# Patient Record
Sex: MALE | Race: WHITE | NOT HISPANIC OR LATINO
[De-identification: names, ages, dates, MRNs, and addresses within clinical notes are randomized per-mention and may not be internally consistent; named-entity substitution may affect disease eponyms.]

---

## 2019-11-18 ENCOUNTER — TRANSCRIPTION ENCOUNTER (OUTPATIENT)
Age: 54
End: 2019-11-18

## 2019-11-24 ENCOUNTER — TRANSCRIPTION ENCOUNTER (OUTPATIENT)
Age: 54
End: 2019-11-24

## 2019-12-03 ENCOUNTER — TRANSCRIPTION ENCOUNTER (OUTPATIENT)
Age: 54
End: 2019-12-03

## 2020-01-18 ENCOUNTER — TRANSCRIPTION ENCOUNTER (OUTPATIENT)
Age: 55
End: 2020-01-18

## 2020-07-06 ENCOUNTER — TRANSCRIPTION ENCOUNTER (OUTPATIENT)
Age: 55
End: 2020-07-06

## 2021-01-05 ENCOUNTER — TRANSCRIPTION ENCOUNTER (OUTPATIENT)
Age: 56
End: 2021-01-05

## 2021-03-30 ENCOUNTER — TRANSCRIPTION ENCOUNTER (OUTPATIENT)
Age: 56
End: 2021-03-30

## 2021-05-02 ENCOUNTER — TRANSCRIPTION ENCOUNTER (OUTPATIENT)
Age: 56
End: 2021-05-02

## 2021-08-30 ENCOUNTER — APPOINTMENT (OUTPATIENT)
Dept: UROLOGY | Facility: CLINIC | Age: 56
End: 2021-08-30
Payer: MEDICAID

## 2021-08-30 VITALS
RESPIRATION RATE: 17 BRPM | DIASTOLIC BLOOD PRESSURE: 73 MMHG | TEMPERATURE: 98 F | OXYGEN SATURATION: 98 % | BODY MASS INDEX: 31.14 KG/M2 | HEIGHT: 73 IN | WEIGHT: 235 LBS | HEART RATE: 70 BPM | SYSTOLIC BLOOD PRESSURE: 108 MMHG

## 2021-08-30 PROCEDURE — 99204 OFFICE O/P NEW MOD 45 MIN: CPT

## 2021-08-30 PROCEDURE — 51798 US URINE CAPACITY MEASURE: CPT

## 2021-08-30 NOTE — PHYSICAL EXAM
[General Appearance - Well Developed] : well developed [General Appearance - Well Nourished] : well nourished [Normal Appearance] : normal appearance [Well Groomed] : well groomed [General Appearance - In No Acute Distress] : no acute distress [Edema] : no peripheral edema [Respiration, Rhythm And Depth] : normal respiratory rhythm and effort [Exaggerated Use Of Accessory Muscles For Inspiration] : no accessory muscle use [Abdomen Soft] : soft [Abdomen Tenderness] : non-tender [Costovertebral Angle Tenderness] : no ~M costovertebral angle tenderness [Urethral Meatus] : meatus normal [Penis Abnormality] : normal circumcised penis [Urinary Bladder Findings] : the bladder was normal on palpation [Scrotum] : the scrotum was normal [Rectal Exam - Seminal Vesicles] : the seminal vesicles were normal [Epididymis] : the epididymides were normal [Testes Tenderness] : no tenderness of the testes [Testes Mass (___cm)] : there were no testicular masses [Rectal Exam - Rectum] : digital rectal exam was normal [Prostate Tenderness] : the prostate was not tender [No Prostate Nodules] : no prostate nodules [Normal Station and Gait] : the gait and station were normal for the patient's age [] : no rash [Oriented To Time, Place, And Person] : oriented to person, place, and time [Affect] : the affect was normal [Mood] : the mood was normal [Not Anxious] : not anxious [Inguinal Lymph Nodes Enlarged Bilaterally] : inguinal [FreeTextEntry1] : enlarged prostate

## 2021-08-30 NOTE — ASSESSMENT
[FreeTextEntry1] : \par plan: \par - ordered new PSA\par - sent new urine culture today\par - started tamsolusin 0.4\par - ordered new ultrasound\par - follow up in 1 month with results and uroflow

## 2021-08-30 NOTE — HISTORY OF PRESENT ILLNESS
[Urinary Urgency] : urinary urgency [Urinary Frequency] : urinary frequency [Nocturia] : nocturia [Straining] : straining [Weak Stream] : weak stream [None] : None [FreeTextEntry1] : 57 yo M for initial consultation\par PSH: bilateral  ACL\par NKDA\par \par for about 10 years complaining of LUTS\par mainly of frequency, started on supplemental medication, never took any medications\par here for consultation due to worsening in symptoms\par complains of: nocturia 3-4, frequency of more than 12 times a day, starting to feel more urgency too, needs to strain, weak stream.\par no history of UTI, retention or hematuria\par \par the patient is also a heavy coffee drinker, discussed lifestyle modifications, including less caffeine and avoiding caffeine and alcohol especially in the evening hours.\par discussed options for medical treatment and side effects, including retrograde ejaculation.\par exam today shows enlarged prostate\par PVR 93 ml\par \par \par has PSA from 2013 - 2.39 ng/ml\par discussed psa screening and the importance of it\par explained what PSA is\par \par \par plan: \par - ordered new PSA\par - sent new urine culture today\par - started tamsolusin 0.4\par - ordered new ultrasound\par - follow up in 1 month with results and uroflow [Urinary Incontinence] : no urinary incontinence [Urinary Retention] : no urinary retention [Dysuria] : no dysuria [Hematuria - Gross] : no gross hematuria [Hematuria - Microscopic] : no microscopic hematuria [Bladder Spasm] : no bladder spasm [Abdominal Pain] : no abdominal pain [Flank Pain] : no flank pain

## 2021-08-30 NOTE — REVIEW OF SYSTEMS
[Earache] : earache [Cough] : cough [Wake up at night to urinate  How many times?  ___] : wakes up to urinate [unfilled] times during the night [Strong urge to urinate] : strong urge to urinate [Strain or push to urinate] : strain or push to urinate [Slow urine stream] : slow urine stream [Joint Pain] : joint pain [Leakage of urine with straining, coughing, laughing] : leakage of urine with straining, coughing, laughing [Joint Swelling] : joint swelling [Limb Swelling] : limb swelling [Dizziness] : dizziness [Limb Weakness] : limb weakness [Difficulty Walking] : difficulty walking [Muscle Weakness] : muscle weakness [Feelings Of Weakness] : feelings of weakness [Negative] : Heme/Lymph

## 2021-09-01 DIAGNOSIS — N39.0 URINARY TRACT INFECTION, SITE NOT SPECIFIED: ICD-10-CM

## 2021-09-01 RX ORDER — SULFAMETHOXAZOLE AND TRIMETHOPRIM 800; 160 MG/1; MG/1
800-160 TABLET ORAL TWICE DAILY
Qty: 14 | Refills: 0 | Status: ACTIVE | COMMUNITY
Start: 2021-09-01 | End: 1900-01-01

## 2021-09-06 LAB — BACTERIA UR CULT: ABNORMAL

## 2021-09-06 RX ORDER — AMOXICILLIN AND CLAVULANATE POTASSIUM 875; 125 MG/1; MG/1
875-125 TABLET, COATED ORAL
Qty: 14 | Refills: 0 | Status: ACTIVE | COMMUNITY
Start: 2021-09-06 | End: 1900-01-01

## 2021-10-04 ENCOUNTER — APPOINTMENT (OUTPATIENT)
Dept: UROLOGY | Facility: CLINIC | Age: 56
End: 2021-10-04

## 2021-10-18 ENCOUNTER — APPOINTMENT (OUTPATIENT)
Dept: UROLOGY | Facility: CLINIC | Age: 56
End: 2021-10-18
Payer: MEDICAID

## 2021-10-18 PROCEDURE — 51798 US URINE CAPACITY MEASURE: CPT

## 2021-10-18 PROCEDURE — 99214 OFFICE O/P EST MOD 30 MIN: CPT

## 2021-10-18 NOTE — PHYSICAL EXAM
[General Appearance - Well Developed] : well developed [General Appearance - Well Nourished] : well nourished [Normal Appearance] : normal appearance [Well Groomed] : well groomed [General Appearance - In No Acute Distress] : no acute distress [Abdomen Soft] : soft [Abdomen Tenderness] : non-tender [Costovertebral Angle Tenderness] : no ~M costovertebral angle tenderness [Skin Color & Pigmentation] : normal skin color and pigmentation [Edema] : no peripheral edema [] : no respiratory distress [Respiration, Rhythm And Depth] : normal respiratory rhythm and effort [Exaggerated Use Of Accessory Muscles For Inspiration] : no accessory muscle use [Oriented To Time, Place, And Person] : oriented to person, place, and time [Affect] : the affect was normal [Mood] : the mood was normal [Not Anxious] : not anxious

## 2021-10-18 NOTE — HISTORY OF PRESENT ILLNESS
[Urinary Frequency] : urinary frequency [Nocturia] : nocturia [None] : None [FreeTextEntry1] : 57 yo M for follow up\par see full notes from previous visit\par \par started on tamsolusin and feeling much better with his voiding\par was also treated for UTI after last visit\par did not have a chance to do the PSA and ultrasound\par \par reviewed symptoms again, patient does not have any special complains\par could not perform a uroflow today (problem with equipment)\par PVR today: 85 ml\par \par again recommended ultrasound and PSA\par \par plan:\par - PSA\par - ultrasound\par - next visit in 1 month for uroflow and PVR

## 2021-10-18 NOTE — ASSESSMENT
[FreeTextEntry1] : \par plan:\par - PSA\par - ultrasound\par - next visit in 1 month for uroflow and PVR

## 2021-10-22 ENCOUNTER — OUTPATIENT (OUTPATIENT)
Dept: OUTPATIENT SERVICES | Facility: HOSPITAL | Age: 56
LOS: 1 days | End: 2021-10-22
Payer: MEDICAID

## 2021-10-22 ENCOUNTER — APPOINTMENT (OUTPATIENT)
Dept: ULTRASOUND IMAGING | Facility: CLINIC | Age: 56
End: 2021-10-22
Payer: MEDICAID

## 2021-10-22 ENCOUNTER — TRANSCRIPTION ENCOUNTER (OUTPATIENT)
Age: 56
End: 2021-10-22

## 2021-10-22 DIAGNOSIS — N40.1 BENIGN PROSTATIC HYPERPLASIA WITH LOWER URINARY TRACT SYMPTOMS: ICD-10-CM

## 2021-10-22 PROCEDURE — 76770 US EXAM ABDO BACK WALL COMP: CPT

## 2021-10-22 PROCEDURE — 76770 US EXAM ABDO BACK WALL COMP: CPT | Mod: 26

## 2021-11-15 ENCOUNTER — APPOINTMENT (OUTPATIENT)
Dept: UROLOGY | Facility: CLINIC | Age: 56
End: 2021-11-15

## 2021-12-06 ENCOUNTER — APPOINTMENT (OUTPATIENT)
Dept: UROLOGY | Facility: CLINIC | Age: 56
End: 2021-12-06
Payer: MEDICAID

## 2021-12-06 VITALS
WEIGHT: 235 LBS | HEART RATE: 68 BPM | OXYGEN SATURATION: 98 % | SYSTOLIC BLOOD PRESSURE: 119 MMHG | BODY MASS INDEX: 31.14 KG/M2 | HEIGHT: 73 IN | DIASTOLIC BLOOD PRESSURE: 83 MMHG

## 2021-12-06 PROCEDURE — 99214 OFFICE O/P EST MOD 30 MIN: CPT

## 2021-12-06 NOTE — HISTORY OF PRESENT ILLNESS
[FreeTextEntry1] : 57 yo male for follow up \par no FH of prostate cancer\par \par on tamsulosin with improved symptoms\par \par ultrasound: \par - normal kidneys\par - diffuse thickening of bladder wall consistent with chronic obstruction, PVR 84 ml\par - prostate 22 ml\par \par still no PSA\par \par also discussed the option of Urolift as a minimally invasive, office based procedure instead of the tamsulosin.\par provided printed information, and will assist in scheduling an appointment with Dr. Hsu if he is interested.\par \par plan:\par - will order PSA again\par - will update results over the phone\par - next visit in 6 months\par

## 2021-12-06 NOTE — ASSESSMENT
[FreeTextEntry1] : \par plan:\par - will order PSA again\par - will update results over the phone\par - next visit in 6 months\par

## 2021-12-17 LAB
PSA FREE FLD-MCNC: 11 %
PSA FREE SERPL-MCNC: 0.19 NG/ML
PSA SERPL-MCNC: 1.74 NG/ML

## 2022-05-10 ENCOUNTER — APPOINTMENT (OUTPATIENT)
Dept: ORTHOPEDIC SURGERY | Facility: CLINIC | Age: 57
End: 2022-05-10
Payer: OTHER MISCELLANEOUS

## 2022-05-10 ENCOUNTER — NON-APPOINTMENT (OUTPATIENT)
Age: 57
End: 2022-05-10

## 2022-05-10 DIAGNOSIS — M25.551 PAIN IN RIGHT HIP: ICD-10-CM

## 2022-05-10 PROCEDURE — 99072 ADDL SUPL MATRL&STAF TM PHE: CPT

## 2022-05-10 PROCEDURE — 99204 OFFICE O/P NEW MOD 45 MIN: CPT | Mod: 25

## 2022-05-10 PROCEDURE — 20610 DRAIN/INJ JOINT/BURSA W/O US: CPT | Mod: RT

## 2022-05-11 PROBLEM — M25.551 RIGHT HIP PAIN: Status: ACTIVE | Noted: 2022-05-10

## 2022-05-11 NOTE — HISTORY OF PRESENT ILLNESS
[de-identified] : JUNO GARCIA is a 57 year y/o male who presents for initial visit of Right hip pain. On 2/26/ patient stepped onto patio, however, it was icy, and accidnetally patient fell onto his Right hip. He has been seeing Dr. Plascencia for hip pain. Dr. Plascencia dx with labral tear and tx with TPI (lateral hip), which provided him short term relief. He has been doing PT at [facility] in [place]; however, he was recently approved for it. He localizes most of his pain over lateral aspect of the hip. He describes pain as dull achy that exacerbates with certain movements. He presents with MRI. MRI reveals: \par \par 1. Anterior superior labral tear \par 2. No evidence of acute osseous injury.

## 2022-05-11 NOTE — REASON FOR VISIT
[Initial Visit] : an initial visit for [Workers' Comp: Date of Injury: _______] : This visit is related to worker's compensation. Date of Injury: [unfilled] [FreeTextEntry2] : Right hip pain

## 2022-05-11 NOTE — PHYSICAL EXAM
[de-identified] : Physical Exam:\par General: Well appearing, no acute distress\par Neurologic: A&Ox3, No focal deficits\par Head: NCAT without abrasions, lacerations, or ecchymosis to head, face, or scalp\par Eyes: No scleral icterus, no gross abnormalities\par Respiratory: Equal chest wall expansion bilaterally, no accessory muscle use\par Lymphatic: No lymphadenopathy palpated\par Skin: Warm and dry\par Psychiatric: Normal mood and affect\par \par \par On inspection of the Right hip shows no gross abnormalities. No loss muscle volume. Skin appears normal. \par Negative Heel strike, negative log roll. \par At 120° of flexion internal rotation is limited to 15°. Not painful in the groin. External rotation is limited to 25°. Mild pain. Hip flexor strength is 4-5 because of pain.  Anterior hip impingement signs are negative No evidence of posterior impingement. \par Resisted straight leg raise does not produce groin pain. No signs of the iliopsoas tendinitis. KARLOS Test (Davey's Test): positive FADIR Test (Labral Tear/LAURA): negative Stinchfield: Negative\par No audible or palpable clicking. \par On lateral decubitus examination there is focal area of her greater trochanteric tenderness. Abductor strength is 5 out of 5.\par \par Motor strength is intact distally, 5/5 over quads,hamstring, EHL, FHL, GSC, TA.\par Sensation intact over L1-S1 dermatomes\par \par Left hip Exam\par \par ·	Skin: Clean/dry and intact\par ·	Inspection: No obvious deformity, no swelling.\par ·	Pulses: 2+ DP/PT pulses\par ·	ROM: 130 flexion without pain. Internal rotation to 15. External rotation to 45.\par ·	Painful ROM: None\par ·	Tenderness: No tenderness over greater trochanter/glut medius insertion. No groin pain. No ttp over the ASIS/Illiac crest.\par ·	Strength: 5/5 ADD/ABD/Q/H/TA/GS/EHL\par ·	Neuro: Sensation in tact to light touch throughout, DTR normal\par ·	Additional tests: Negative impingement test  [de-identified] : Bilateral AP, frog leg, Mcginnis views and false profile views were performed today in the office. They demonstrate no fracture, no deformity, no dislocation, no bony lesions.\par  \par Procedure: MRI of Right hip \par Dated: 4/13/2022\par \par Impression:\par \par 1. Anterior superior labral tear \par 2. No evidence of acute osseous injury.

## 2022-05-11 NOTE — ADDENDUM
[FreeTextEntry1] : Documented by Gustavo Foreman acting as a scribe for Dr. Siu and Rajnit Laurent PA-C on 05/10/2022. \par \par All medical record entries made by the Scribe were at my, Ranjit Laurent's, direction and\par personally dictated by me on 05/10/2022. I have reviewed the chart and agree that the record\par accurately reflects my personal performance of the history, physical exam, procedure and imaging.

## 2022-05-11 NOTE — REVIEW OF SYSTEMS
[Arthralgia] : arthralgia [Joint Pain] : joint pain [Negative] : Heme/Lymph [FreeTextEntry9] : Right hip

## 2022-05-11 NOTE — DISCUSSION/SUMMARY
[de-identified] : JUNO GARCIA is a 57 year y/o male who presents for initial visit of Right hip pain. On 2/26/ patient stepped onto patio, however, it was icy, and accidnetally patient fell onto his Right hip. He has been seeing Dr. Plascencia for hip pain. Dr. Plascencia dx with labral tear and tx with TPI (lateral hip), which provided him short term relief. He has been doing PT at [facility] in [place]; however, he was recently approved for it. He localizes most of his pain over lateral aspect of the hip. He describes pain as dull achy that exacerbates with certain movements. He presents with MRI. \par \par We had a thorough discussion regarding the nature of his pain, the pathophysiology, as well as all treatment options. The patient has symptoms of greater trochanteric bursitis/gluteus medius insertional tendinopathy that is consistent with clinical examination today. Aggravating factors may include prolonged sitting, lying or standing on the affected side, and the patient was counseled on avoidance. Treatment of this condition typically involves anti-inflammatory medications versus corticosteroid/lidocaine injections (if able) and physical therapy that includes core and hip strengthening, stretching of the iliotibial band, and lower extremity mobilization. His MRI does indicate labral tear; however clinically patient does not have pain secondary to labral tear. Pt due to his acute pain elected for a corticosteroid injection at today's visit and tolerated the procedure well. He should take it easy for the next 2-3 days while icing the joint. Conservative measures of treatment include rest until asymptomatic, activity avoidance, NSAID's PRN, application to ice to the area 2-3x daily for 20 minutes, with gradual return to activities. He will continue PT. Patient will follow up in 6-8 wks for repeat clinical assessment. All questions were answered and the patient verbalized understanding. The patient is in agreement with this treatment plan. \par \par

## 2022-05-11 NOTE — PROCEDURE
[de-identified] : Injection: Right hip trochanteric bursa.\par Indication: Trochanteric bursitis.\par \par A discussion was had with the patient regarding this procedure and all questions were answered. All risks, benefits and alternatives were discussed. These included but were not limited to bleeding, infection, and allergic reaction. The patient lay in the left lateral decubitus position and the point of maximal tenderness over the right greater trochanter was localized. Alcohol was then used to clean the skin, and betadine was used to sterilize and prep the area in this location on lateral aspect of the hip. Ethyl chloride spray was then used as a topical anesthetic. A 22-gauge spinal needle was used to inject 3cc of 1% Xylocaine, 2cc of 0.25% Bupivacaine and 1cc of 40mg/mL Triamcinolone Acetonide into the greater trochanteric bursa. A sterile bandage was then applied. The patient tolerated the procedure well and there were no complications.

## 2022-06-28 ENCOUNTER — APPOINTMENT (OUTPATIENT)
Dept: ORTHOPEDIC SURGERY | Facility: CLINIC | Age: 57
End: 2022-06-28
Payer: OTHER MISCELLANEOUS

## 2022-06-28 PROCEDURE — 99072 ADDL SUPL MATRL&STAF TM PHE: CPT

## 2022-06-28 PROCEDURE — 99214 OFFICE O/P EST MOD 30 MIN: CPT

## 2022-06-29 ENCOUNTER — FORM ENCOUNTER (OUTPATIENT)
Age: 57
End: 2022-06-29

## 2022-06-29 NOTE — PHYSICAL EXAM
[de-identified] : Physical Exam:\par General: Well appearing, no acute distress\par Neurologic: A&Ox3, No focal deficits\par Head: NCAT without abrasions, lacerations, or ecchymosis to head, face, or scalp\par Eyes: No scleral icterus, no gross abnormalities\par Respiratory: Equal chest wall expansion bilaterally, no accessory muscle use\par Lymphatic: No lymphadenopathy palpated\par Skin: Warm and dry\par Psychiatric: Normal mood and affect\par \par \par On inspection of the Right hip shows no gross abnormalities. No loss muscle volume. Skin appears normal. \par Negative Heel strike, negative log roll. \par At 120° of flexion internal rotation is limited to 15°. + painful in the groin. External rotation is limited to 25°. Mild pain. Hip flexor strength is 4-5 because of pain.  Anterior hip impingement signs are negative. No evidence of posterior impingement. \par Resisted straight leg raise does not produce groin pain. No signs of the iliopsoas tendinitis. KARLOS Test (Davey's Test): positive FADIR Test (Labral Tear/LAURA): negative Stinchfield: Negative\par No audible or palpable clicking. \par On lateral decubitus examination there is focal area of her greater trochanteric tenderness. Abductor strength is 5 out of 5.\par \par Motor strength is intact distally, 5/5 over quads,hamstring, EHL, FHL, GSC, TA.\par Sensation intact over L1-S1 dermatomes\par \par Left hip Exam\par \par ·	Skin: Clean/dry and intact\par ·	Inspection: No obvious deformity, no swelling.\par ·	Pulses: 2+ DP/PT pulses\par ·	ROM: 130 flexion without pain. Internal rotation to 15. External rotation to 45.\par ·	Painful ROM: None\par ·	Tenderness: No tenderness over greater trochanter/glut medius insertion. No groin pain. No ttp over the ASIS/Illiac crest.\par ·	Strength: 5/5 ADD/ABD/Q/H/TA/GS/EHL\par ·	Neuro: Sensation in tact to light touch throughout, DTR normal\par ·	Additional tests: Negative impingement test  [de-identified] : Bilateral AP, frog leg, Mcginnis views and false profile views were performed today in the office. They demonstrate no fracture, no deformity, no dislocation, no bony lesions.\par  \par Procedure: MRI of Right hip \par Dated: 4/13/2022\par \par Impression:\par \par 1. Anterior superior labral tear \par 2. No evidence of acute osseous injury.

## 2022-06-29 NOTE — HISTORY OF PRESENT ILLNESS
[de-identified] : JUNO GARCIA is a 57 year y/o male who presents for initial visit of Right hip pain. On 2/26/ patient stepped onto patio, however, it was icy, and accidnetally patient fell onto his Right hip. He has been seeing Dr. Plascnecia for hip pain. Dr. Plascencia dx with labral tear and tx with TPI (lateral hip), which provided him short term relief.  He had a greater trochanteric bursal injection at his last visit which offered him 25% relief.  He has been doing PT at [facility] in [place]; however, he was recently approved for it. He also reports he is being treated by a chiropractor for his lower back and neck.  He is having pain in the gluteal region radiating towards his groin and down his leg. Prior MRI reveals: \par \par 1. Anterior superior labral tear \par 2. No evidence of acute osseous injury.

## 2022-06-29 NOTE — DISCUSSION/SUMMARY
[de-identified] : JUNO GARCIA is a 57 year y/o male who presents for initial visit of Right hip pain. On 2/26/ patient stepped onto patio, however, it was icy, and accidnetally patient fell onto his Right hip. He has been seeing Dr. Plascencia for hip pain. Dr. Plascencia dx with labral tear and tx with TPI (lateral hip), which provided him short term relief. He has been doing PT at [facility] in [place]; however, he was recently approved for it. He localizes most of his pain over lateral aspect of the hip. He describes pain as dull achy that exacerbates with certain movements. He presents with MRI. \par \par We had a thorough discussion regarding the nature of his pain, the pathophysiology, as well as all treatment options. The patient has a mixed clinical picture.  I explained that some of his pain could certainly be coming from his lower back.  He got 25% relief with a trochanteric bursal injection.  He does appear to have some intra-articular pain.  I recommended an intra-articular therapeutic and diagnostic cortisone injection for his right hip.  We will see how he responds to this injection.  If he gets no relief he may require further work-up of his lumbar spine and further treatment by neurosurgery.  He will follow-up in 4 weeks.  He reports he was recently let go of his position at his job and is unemployed.  He currently demonstrates a moderate to severe temporary impairment.

## 2022-07-11 ENCOUNTER — FORM ENCOUNTER (OUTPATIENT)
Age: 57
End: 2022-07-11

## 2022-07-26 ENCOUNTER — APPOINTMENT (OUTPATIENT)
Dept: ORTHOPEDIC SURGERY | Facility: CLINIC | Age: 57
End: 2022-07-26

## 2022-08-18 ENCOUNTER — APPOINTMENT (OUTPATIENT)
Dept: ORTHOPEDIC SURGERY | Facility: CLINIC | Age: 57
End: 2022-08-18

## 2022-09-13 ENCOUNTER — APPOINTMENT (OUTPATIENT)
Dept: ORTHOPEDIC SURGERY | Facility: CLINIC | Age: 57
End: 2022-09-13

## 2022-09-13 PROCEDURE — 99214 OFFICE O/P EST MOD 30 MIN: CPT

## 2022-09-13 PROCEDURE — 99072 ADDL SUPL MATRL&STAF TM PHE: CPT

## 2022-09-15 NOTE — ADDENDUM
[FreeTextEntry1] : Documented by Terri Turner acting as a scribe for Dr. Siu and Ranjit Laurent PA-C on 09/13/2022. \par \par All medical record entries made by the Scribe were at my, Dr. Siu, and Ranjit Laurent's, direction and\par personally dictated by me on 09/13/2022. I have reviewed the chart and agree that the record\par accurately reflects my personal performance of the history, physical exam, procedure and imaging.

## 2022-09-15 NOTE — HISTORY OF PRESENT ILLNESS
[de-identified] : JUNO GARCIA is a 57 year y/o male who presents for f/u visit of right hip pain. Pt received a prescription for a steroid injection, however was denied when he reached out to the [facility].

## 2022-09-15 NOTE — PHYSICAL EXAM
[de-identified] : Physical Exam:\par General: Well appearing, no acute distress\par Neurologic: A&Ox3, No focal deficits\par Head: NCAT without abrasions, lacerations, or ecchymosis to head, face, or scalp\par Eyes: No scleral icterus, no gross abnormalities\par Respiratory: Equal chest wall expansion bilaterally, no accessory muscle use\par Lymphatic: No lymphadenopathy palpated\par Skin: Warm and dry\par Psychiatric: Normal mood and affect\par \par On inspection of the Right hip shows no gross abnormalities. No loss muscle volume. Skin appears normal. \par Negative Heel strike, negative log roll. \par At 120° of flexion internal rotation is limited to 15°. + painful in the groin. External rotation is limited to 25°. Mild pain. Hip flexor strength is 4-5 because of pain.  Anterior hip impingement signs are negative. No evidence of posterior impingement. \par Resisted straight leg raise does not produce groin pain. No signs of the iliopsoas tendinitis. KARLOS Test (Davey's Test): positive FADIR Test (Labral Tear/LAURA): negative Stinchfield: Negative\par No audible or palpable clicking. \par On lateral decubitus examination there is focal area of her greater trochanteric tenderness. Abductor strength is 5 out of 5.\par \par Motor strength is intact distally, 5/5 over quads,hamstring, EHL, FHL, GSC, TA.\par Sensation intact over L1-S1 dermatomes\par \par Left hip Exam\par \par ·	Skin: Clean/dry and intact\par ·	Inspection: No obvious deformity, no swelling.\par ·	Pulses: 2+ DP/PT pulses\par ·	ROM: 130 flexion without pain. Internal rotation to 15. External rotation to 45.\par ·	Painful ROM: None\par ·	Tenderness: No tenderness over greater trochanter/glut medius insertion. No groin pain. No ttp over the ASIS/Illiac crest.\par ·	Strength: 5/5 ADD/ABD/Q/H/TA/GS/EHL\par ·	Neuro: Sensation in tact to light touch throughout, DTR normal\par ·	Additional tests: Negative impingement test  [de-identified] : No new imaging done today.

## 2022-09-15 NOTE — DISCUSSION/SUMMARY
[de-identified] : JUNO GARCIA is a 57 year y/o male who presents for f/u visit of right hip pain. Pt received a prescription for a steroid injection, however was denied when he reached out to the [facility]. \par \par We had a thorough discussion regarding the nature of his pain, the pathophysiology, as well as all treatment options. I discussed with the patient that the [facility] should accept his insurance for an injection, and to reach out again. All questions were answered and the patient verbalized understanding. The patient is in agreement with this treatment plan.

## 2022-09-15 NOTE — REASON FOR VISIT
[Workers' Comp: Date of Injury: _______] : This visit is related to worker's compensation. Date of Injury: [unfilled] [Initial Visit] : an initial visit for [FreeTextEntry2] : Right hip pain

## 2022-09-20 ENCOUNTER — APPOINTMENT (OUTPATIENT)
Dept: ULTRASOUND IMAGING | Facility: CLINIC | Age: 57
End: 2022-09-20

## 2022-11-03 ENCOUNTER — APPOINTMENT (OUTPATIENT)
Dept: ORTHOPEDIC SURGERY | Facility: CLINIC | Age: 57
End: 2022-11-03

## 2022-11-17 ENCOUNTER — APPOINTMENT (OUTPATIENT)
Dept: ORTHOPEDIC SURGERY | Facility: CLINIC | Age: 57
End: 2022-11-17

## 2022-12-05 ENCOUNTER — RX RENEWAL (OUTPATIENT)
Age: 57
End: 2022-12-05

## 2022-12-05 ENCOUNTER — APPOINTMENT (OUTPATIENT)
Dept: ORTHOPEDIC SURGERY | Facility: CLINIC | Age: 57
End: 2022-12-05

## 2022-12-19 ENCOUNTER — APPOINTMENT (OUTPATIENT)
Dept: ORTHOPEDIC SURGERY | Facility: CLINIC | Age: 57
End: 2022-12-19

## 2023-01-16 ENCOUNTER — APPOINTMENT (OUTPATIENT)
Dept: ORTHOPEDIC SURGERY | Facility: CLINIC | Age: 58
End: 2023-01-16

## 2023-02-14 ENCOUNTER — APPOINTMENT (OUTPATIENT)
Dept: ORTHOPEDIC SURGERY | Facility: CLINIC | Age: 58
End: 2023-02-14

## 2023-03-04 ENCOUNTER — RX RENEWAL (OUTPATIENT)
Age: 58
End: 2023-03-04

## 2023-04-11 ENCOUNTER — APPOINTMENT (OUTPATIENT)
Dept: ORTHOPEDIC SURGERY | Facility: CLINIC | Age: 58
End: 2023-04-11
Payer: OTHER MISCELLANEOUS

## 2023-04-11 PROCEDURE — 99214 OFFICE O/P EST MOD 30 MIN: CPT

## 2023-04-13 ENCOUNTER — FORM ENCOUNTER (OUTPATIENT)
Age: 58
End: 2023-04-13

## 2023-04-13 NOTE — HISTORY OF PRESENT ILLNESS
[de-identified] : The patient is a 58 year old male presenting for a follow-up visit of right hip pain, WRA 2/26/2022. The patient continues to feel pain that radiated down into his groin. He states that he is unable to walk for prolonged periods of time due to his symptoms. He has been compliant with physical therapy until he was denied for further benefits in January. He states that he has been compensating his gait. He presents today ambulating without assistance. No other complaints. \par \par The patient is currently out of work and is 100% disabled at this time

## 2023-04-13 NOTE — PHYSICAL EXAM
[de-identified] : Physical Exam:\par General: Well appearing, no acute distress\par Neurologic: A&Ox3, No focal deficits\par Head: NCAT without abrasions, lacerations, or ecchymosis to head, face, or scalp\par Eyes: No scleral icterus, no gross abnormalities\par Respiratory: Equal chest wall expansion bilaterally, no accessory muscle use\par Lymphatic: No lymphadenopathy palpated\par Skin: Warm and dry\par Psychiatric: Normal mood and affect\par \par On inspection of the Right hip shows no gross abnormalities. No loss muscle volume. Skin appears normal. \par Negative Heel strike, negative log roll. \par At 120° of flexion internal rotation is limited to 15°. + painful in the groin. External rotation is limited to 25°. Mild pain. Hip flexor strength is 4-5 because of pain.  Anterior hip impingement signs are negative. No evidence of posterior impingement. \par Resisted straight leg raise does not produce groin pain. No signs of the iliopsoas tendinitis. KARLOS Test (Davey's Test): positive FADIR Test (Labral Tear/LAURA): negative Stinchfield: Negative\par No audible or palpable clicking. \par On lateral decubitus examination there is focal area of her greater trochanteric tenderness. Abductor strength is 5 out of 5.\par \par Motor strength is intact distally, 5/5 over quads,hamstring, EHL, FHL, GSC, TA.\par Sensation intact over L1-S1 dermatomes\par \par Left hip Exam\par \par ·	Skin: Clean/dry and intact\par ·	Inspection: No obvious deformity, no swelling.\par ·	Pulses: 2+ DP/PT pulses\par ·	ROM: 130 flexion without pain. Internal rotation to 15. External rotation to 45.\par ·	Painful ROM: None\par ·	Tenderness: No tenderness over greater trochanter/glut medius insertion. No groin pain. No ttp over the ASIS/Illiac crest.\par ·	Strength: 5/5 ADD/ABD/Q/H/TA/GS/EHL\par ·	Neuro: Sensation in tact to light touch throughout, DTR normal\par ·	Additional tests: Negative impingement test  [de-identified] : No new imaging done today.

## 2023-04-13 NOTE — DISCUSSION/SUMMARY
[de-identified] : We had a thorough discussion regarding the nature of his pain, the pathophysiology, as well as all treatment options. Due to patient's current presentation of pain, I have referred patient for an ultrasound guided intrarticular injection of the hip. The patient is currently out of work and is 100% disabled at this time, although temporary. Patient will follow up in 6-8 wks for repeat clinical assessment. All questions were answered and the patient verbalized understanding. The patient is in agreement with this treatment plan.

## 2023-04-13 NOTE — ADDENDUM
[FreeTextEntry1] : Documented by Ana M Caicedo acting as a scribe for Dr. Siu and Ranjit Laurent PA-C on 04/11/2023.   All medical record entries made by the Scribe were at my, Dr. Siu, and Ranjit Laurent's, direction and personally dictated by me on 04/11/2023. I have reviewed the chart and agree that the record accurately reflects my personal performance of the history, physical exam, procedure and imaging.

## 2023-05-02 ENCOUNTER — FORM ENCOUNTER (OUTPATIENT)
Age: 58
End: 2023-05-02

## 2023-06-01 ENCOUNTER — RX RENEWAL (OUTPATIENT)
Age: 58
End: 2023-06-01

## 2023-06-06 ENCOUNTER — APPOINTMENT (OUTPATIENT)
Dept: ORTHOPEDIC SURGERY | Facility: CLINIC | Age: 58
End: 2023-06-06

## 2023-06-20 ENCOUNTER — APPOINTMENT (OUTPATIENT)
Dept: ORTHOPEDIC SURGERY | Facility: CLINIC | Age: 58
End: 2023-06-20
Payer: MEDICAID

## 2023-06-20 PROCEDURE — 99214 OFFICE O/P EST MOD 30 MIN: CPT

## 2023-07-04 NOTE — HISTORY OF PRESENT ILLNESS
[de-identified] : JUNO is a 58 year male here today for because Domitila needed another script they wouldn’t give patient the cortisone injection.  Overall is similar symptoms in his hip.  He complains of pain consistently.  Pain is worse with activities.  Pain is worse at night.  He is only seen mild improvement with physical therapy. [FreeTextEntry1] : \par

## 2023-07-04 NOTE — DISCUSSION/SUMMARY
[de-identified] : Ranjit is a 50-year-old male comes in for follow-up of his right hip pain.  Overall he feels no improvement with physical therapy.  Another prescription for an ultrasound-guided injection was given to him today.  Also 5 vitamin with a number of my partner who performs joint replacement surgery.  He may want to consider that either near future versus long-term treatments he will follow-up with me as needed.  All questions were answered agrees above plan.

## 2023-07-04 NOTE — PHYSICAL EXAM
[de-identified] : Physical Exam:\par General: Well appearing, no acute distress\par Neurologic: A&Ox3, No focal deficits\par Head: NCAT without abrasions, lacerations, or ecchymosis to head, face, or scalp\par Eyes: No scleral icterus, no gross abnormalities\par Respiratory: Equal chest wall expansion bilaterally, no accessory muscle use\par Lymphatic: No lymphadenopathy palpated\par Skin: Warm and dry\par Psychiatric: Normal mood and affect\par \par On inspection of the Right hip shows no gross abnormalities. No loss muscle volume. Skin appears normal. \par Negative Heel strike, negative log roll. \par At 120° of flexion internal rotation is limited to 15°. + painful in the groin. External rotation is limited to 25°. Mild pain. Hip flexor strength is 4-5 because of pain.  Anterior hip impingement signs are negative. No evidence of posterior impingement. \par Resisted straight leg raise does not produce groin pain. No signs of the iliopsoas tendinitis. KARLOS Test (Davey's Test): positive FADIR Test (Labral Tear/LAURA): negative Stinchfield: Negative\par No audible or palpable clicking. \par On lateral decubitus examination there is focal area of her greater trochanteric tenderness. Abductor strength is 5 out of 5.\par \par Motor strength is intact distally, 5/5 over quads,hamstring, EHL, FHL, GSC, TA.\par Sensation intact over L1-S1 dermatomes\par \par Left hip Exam\par \par ·	Skin: Clean/dry and intact\par ·	Inspection: No obvious deformity, no swelling.\par ·	Pulses: 2+ DP/PT pulses\par ·	ROM: 130 flexion without pain. Internal rotation to 15. External rotation to 45.\par ·	Painful ROM: None\par ·	Tenderness: No tenderness over greater trochanter/glut medius insertion. No groin pain. No ttp over the ASIS/Illiac crest.\par ·	Strength: 5/5 ADD/ABD/Q/H/TA/GS/EHL\par ·	Neuro: Sensation in tact to light touch throughout, DTR normal\par ·	Additional tests: Negative impingement test

## 2023-07-04 NOTE — REVIEW OF SYSTEMS
[Joint Pain] : joint pain [Joint Stiffness] : joint stiffness [Negative] : Heme/Lymph [FreeTextEntry9] : Right hip

## 2023-08-29 ENCOUNTER — APPOINTMENT (OUTPATIENT)
Dept: ORTHOPEDIC SURGERY | Facility: CLINIC | Age: 58
End: 2023-08-29

## 2023-09-18 ENCOUNTER — APPOINTMENT (OUTPATIENT)
Dept: ORTHOPEDIC SURGERY | Facility: CLINIC | Age: 58
End: 2023-09-18

## 2023-10-04 ENCOUNTER — APPOINTMENT (OUTPATIENT)
Dept: UROLOGY | Facility: CLINIC | Age: 58
End: 2023-10-04

## 2023-10-06 ENCOUNTER — APPOINTMENT (OUTPATIENT)
Dept: GASTROENTEROLOGY | Facility: CLINIC | Age: 58
End: 2023-10-06

## 2023-10-25 ENCOUNTER — APPOINTMENT (OUTPATIENT)
Dept: UROLOGY | Facility: CLINIC | Age: 58
End: 2023-10-25

## 2023-10-27 ENCOUNTER — APPOINTMENT (OUTPATIENT)
Dept: UROLOGY | Facility: CLINIC | Age: 58
End: 2023-10-27
Payer: MEDICAID

## 2023-10-27 VITALS
SYSTOLIC BLOOD PRESSURE: 105 MMHG | DIASTOLIC BLOOD PRESSURE: 68 MMHG | OXYGEN SATURATION: 100 % | RESPIRATION RATE: 16 BRPM | HEART RATE: 72 BPM

## 2023-10-27 DIAGNOSIS — N13.8 BENIGN PROSTATIC HYPERPLASIA WITH LOWER URINARY TRACT SYMPMS: ICD-10-CM

## 2023-10-27 DIAGNOSIS — N40.1 BENIGN PROSTATIC HYPERPLASIA WITH LOWER URINARY TRACT SYMPMS: ICD-10-CM

## 2023-10-27 PROCEDURE — 99214 OFFICE O/P EST MOD 30 MIN: CPT

## 2023-10-27 RX ORDER — TAMSULOSIN HYDROCHLORIDE 0.4 MG/1
0.4 CAPSULE ORAL
Qty: 90 | Refills: 0 | Status: DISCONTINUED | COMMUNITY
Start: 2021-08-30 | End: 2023-10-27

## 2023-10-30 ENCOUNTER — APPOINTMENT (OUTPATIENT)
Dept: ORTHOPEDIC SURGERY | Facility: CLINIC | Age: 58
End: 2023-10-30
Payer: OTHER MISCELLANEOUS

## 2023-10-30 VITALS — WEIGHT: 235 LBS | BODY MASS INDEX: 31.14 KG/M2 | HEIGHT: 73 IN

## 2023-10-30 PROCEDURE — 99214 OFFICE O/P EST MOD 30 MIN: CPT

## 2023-11-08 ENCOUNTER — APPOINTMENT (OUTPATIENT)
Dept: UROLOGY | Facility: CLINIC | Age: 58
End: 2023-11-08

## 2023-12-06 ENCOUNTER — APPOINTMENT (OUTPATIENT)
Dept: UROLOGY | Facility: CLINIC | Age: 58
End: 2023-12-06

## 2024-01-08 ENCOUNTER — APPOINTMENT (OUTPATIENT)
Dept: ORTHOPEDIC SURGERY | Facility: CLINIC | Age: 59
End: 2024-01-08

## 2024-01-23 ENCOUNTER — APPOINTMENT (OUTPATIENT)
Dept: ORTHOPEDIC SURGERY | Facility: CLINIC | Age: 59
End: 2024-01-23
Payer: OTHER MISCELLANEOUS

## 2024-01-23 DIAGNOSIS — M70.61 TROCHANTERIC BURSITIS, RIGHT HIP: ICD-10-CM

## 2024-01-23 DIAGNOSIS — S73.191D OTHER SPRAIN OF RIGHT HIP, SUBSEQUENT ENCOUNTER: ICD-10-CM

## 2024-01-23 PROCEDURE — 99214 OFFICE O/P EST MOD 30 MIN: CPT

## 2024-01-23 NOTE — ADDENDUM
[FreeTextEntry1] : Documented by Julianna Jaramillo acting as a scribe for Dr. Siu on 01/23/2024. All medical record entries made by the Scribe were at my, Dr. Siu's, direction and personally dictated by me on 01/23/2024. I have reviewed the chart and agree that the record accurately reflects my personal performance of the history, physical exam, procedure and imaging.

## 2024-01-23 NOTE — PHYSICAL EXAM
[de-identified] : Physical Exam: General: Well appearing, no acute distress Neurologic: A&Ox3, No focal deficits Head: NCAT without abrasions, lacerations, or ecchymosis to head, face, or scalp Eyes: No scleral icterus, no gross abnormalities Respiratory: Equal chest wall expansion bilaterally, no accessory muscle use Lymphatic: No lymphadenopathy palpated Skin: Warm and dry Psychiatric: Normal mood and affect  On inspection of the Right hip shows no gross abnormalities. No loss muscle volume. Skin appears normal.  Negative Heel strike, negative log roll.  At 120 of flexion internal rotation is limited to 15. + painful in the groin. External rotation is limited to 25. Mild pain. Hip flexor strength is 4-5 because of pain.  Anterior hip impingement signs are negative. No evidence of posterior impingement.  Resisted straight leg raise does not produce groin pain. No signs of the iliopsoas tendinitis. KARLOS Test (Davey's Test): positive FADIR Test (Labral Tear/LAURA): negative Stinchfield: Negative No audible or palpable clicking.  On lateral decubitus examination there is focal area of her greater trochanteric tenderness. Abductor strength is 5 out of 5. Motor strength is intact distally, 5/5 over quads, hamstring, EHL, FHL, GSC, TA. Sensation intact over L1-S1 dermatomes  Left hip Exam  	Skin: Clean/dry and intact 	Inspection: No obvious deformity, no swelling. 	Pulses: 2+ DP/PT pulses 	ROM: 130 flexion without pain. Internal rotation to 15. External rotation to 45. 	Painful ROM: None 	Tenderness: No tenderness over greater trochanter/glut medius insertion. No groin pain. No ttp over the ASIS/Illiac crest. 	Strength: 5/5 ADD/ABD/Q/H/TA/GS/EHL 	Neuro: Sensation in tact to light touch throughout, DTR normal 	Additional tests: Negative impingement test  [de-identified] : No new imaging today

## 2024-01-23 NOTE — DISCUSSION/SUMMARY
[de-identified] : We had a thorough discussion regarding the nature of his pain, the pathophysiology, as well as all treatment options. Patient was given prescription of formal physical therapy that he will perform 2x/wk for 6-8 wks. I gave the patient a referral to a joint replacement specialist. 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 All questions were answered and the patient verbalized understanding. The patient is in agreement with this treatment plan.

## 2024-02-02 ENCOUNTER — RX RENEWAL (OUTPATIENT)
Age: 59
End: 2024-02-02

## 2024-02-07 ENCOUNTER — APPOINTMENT (OUTPATIENT)
Dept: UROLOGY | Facility: CLINIC | Age: 59
End: 2024-02-07
Payer: MEDICAID

## 2024-02-07 DIAGNOSIS — Z12.5 ENCOUNTER FOR SCREENING FOR MALIGNANT NEOPLASM OF PROSTATE: ICD-10-CM

## 2024-02-07 PROCEDURE — 99214 OFFICE O/P EST MOD 30 MIN: CPT | Mod: 25

## 2024-02-07 PROCEDURE — 81003 URINALYSIS AUTO W/O SCOPE: CPT | Mod: QW

## 2024-02-07 PROCEDURE — 52000 CYSTOURETHROSCOPY: CPT

## 2024-02-07 RX ORDER — TAMSULOSIN HYDROCHLORIDE 0.4 MG/1
0.4 CAPSULE ORAL
Qty: 60 | Refills: 5 | Status: ACTIVE | COMMUNITY
Start: 2023-10-27 | End: 1900-01-01

## 2024-02-07 NOTE — HISTORY OF PRESENT ILLNESS
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 Procedure, in hospital stay and recovery explained. Indications, options including chronic flores catheter, suprapubic catheter, intermittent self-catheterization, transurethral resection of prostate, transurethral vaporization of prostate with laser or electrocautery, open or laparoscopic enucleation of the prostate, all discussed. Potential complications of transurethral holmium or thulium laser enucleation of prostate with morcellation discussed. This included risk of infection, bleeding, transfusion, conversion to open enucleation, need for staged procedure, adjacent organ injury, bladder injury, clot retention of urine requiring return to the operating room for cystoscopy clot evacuation, prolonged duration of flores catheterization, urethral stricture, transient or permanent urinary incontinence,  retrograde ejaculation as a permanent and irreversible complication, redo or staged  surgery due to equipment malfunction, anesthetic complications, and cardiac complications all discussed. Printed information including all of the above and more uncommon complications provided to the patient.   plan: - tamsulosin 0.8 - will consider LEP - will schedule follow up in 1 month with uroflow

## 2024-02-09 LAB
BILIRUB UR QL STRIP: NORMAL
GLUCOSE UR-MCNC: NORMAL
HCG UR QL: 1 EU/DL
HGB UR QL STRIP.AUTO: NORMAL
KETONES UR-MCNC: NORMAL
LEUKOCYTE ESTERASE UR QL STRIP: NORMAL
NITRITE UR QL STRIP: NORMAL
PH UR STRIP: 6
PROT UR STRIP-MCNC: NORMAL
SP GR UR STRIP: 1.02

## 2024-02-10 PROBLEM — Z12.5 SCREENING PSA (PROSTATE SPECIFIC ANTIGEN): Status: ACTIVE | Noted: 2021-08-30

## 2024-03-08 ENCOUNTER — APPOINTMENT (OUTPATIENT)
Dept: UROLOGY | Facility: CLINIC | Age: 59
End: 2024-03-08

## 2024-06-03 ENCOUNTER — APPOINTMENT (OUTPATIENT)
Dept: UROLOGY | Facility: CLINIC | Age: 59
End: 2024-06-03

## 2024-06-24 ENCOUNTER — APPOINTMENT (OUTPATIENT)
Dept: UROLOGY | Facility: CLINIC | Age: 59
End: 2024-06-24